# Patient Record
Sex: FEMALE | Race: OTHER | NOT HISPANIC OR LATINO
[De-identification: names, ages, dates, MRNs, and addresses within clinical notes are randomized per-mention and may not be internally consistent; named-entity substitution may affect disease eponyms.]

---

## 2018-08-23 ENCOUNTER — TRANSCRIPTION ENCOUNTER (OUTPATIENT)
Age: 68
End: 2018-08-23

## 2018-08-24 ENCOUNTER — EMERGENCY (EMERGENCY)
Facility: HOSPITAL | Age: 68
LOS: 1 days | Discharge: HOME | End: 2018-08-24
Attending: EMERGENCY MEDICINE
Payer: COMMERCIAL

## 2018-08-24 VITALS
RESPIRATION RATE: 16 BRPM | WEIGHT: 149.91 LBS | SYSTOLIC BLOOD PRESSURE: 142 MMHG | OXYGEN SATURATION: 98 % | HEART RATE: 72 BPM | TEMPERATURE: 98 F | DIASTOLIC BLOOD PRESSURE: 85 MMHG

## 2018-08-24 VITALS
OXYGEN SATURATION: 98 % | HEART RATE: 76 BPM | RESPIRATION RATE: 16 BRPM | DIASTOLIC BLOOD PRESSURE: 80 MMHG | SYSTOLIC BLOOD PRESSURE: 134 MMHG

## 2018-08-24 PROCEDURE — 99283 EMERGENCY DEPT VISIT LOW MDM: CPT

## 2018-08-24 PROCEDURE — 99285 EMERGENCY DEPT VISIT HI MDM: CPT | Mod: 25

## 2018-08-24 PROCEDURE — 13131 CMPLX RPR F/C/C/M/N/AX/G/H/F: CPT

## 2018-08-24 NOTE — ED ADULT NURSE NOTE - NSIMPLEMENTINTERV_GEN_ALL_ED
Implemented All Fall Risk Interventions:  [place] to call system. Call bell, personal items and telephone within reach. Instruct patient to call for assistance. Room bathroom lighting operational. Non-slip footwear when patient is off stretcher. Physically safe environment: no spills, clutter or unnecessary equipment. Stretcher in lowest position, wheels locked, appropriate side rails in place. Provide visual cue, wrist band, yellow gown, etc. Monitor gait and stability. Monitor for mental status changes and reorient to person, place, and time. Review medications for side effects contributing to fall risk. Reinforce activity limits and safety measures with patient and family.

## 2018-08-24 NOTE — ED PROVIDER NOTE - OBJECTIVE STATEMENT
67 y F mechanical trip and fall onto b/l knees, struck face on ground, pain and lac at nasal bridge.  No AMS, LOC, n/v, bleeding from nose.  Does not take medications.  No HA.  No neck pain.  No sensory deficits.  No motor weakness.  Unknown last tdap; declines update at this time after r/b/a discussion.  Family called plastic surgery for laceration prior to arrival.  No other complaints.

## 2018-08-24 NOTE — ED PROVIDER NOTE - MEDICAL DECISION MAKING DETAILS
Otherwise healthy 67 y F facial laceration after mechanical fall.  Plastics to close.  B/L knee abrasions with some TTP at tib plateau.  XRs, CTs; declines tdap and pain Rx.  Likely d/c.  --BMM

## 2018-08-24 NOTE — ED ADULT NURSE NOTE - OBJECTIVE STATEMENT
66 y/o female seen in [facility] s/p tripped and fell onto bilateral knees, pt struck face on the ground, sustaining laceration and pain to nasal bridge.  No LOC, no bleeding from nose, no n/v/d, no headache, no neck pain, no chest pain.  No acute respiratory distress noted.  1cm laceration to bridge of nose, mild swelling at bridge of nose.  Bilateral knee abrasion.

## 2018-08-24 NOTE — ED PROVIDER NOTE - CARE PLAN
Principal Discharge DX:	Nasal laceration, initial encounter  Goal:	b/l  Secondary Diagnosis:	Knee abrasion, unspecified laterality, initial encounter

## 2018-08-24 NOTE — ED PROVIDER NOTE - PHYSICAL EXAMINATION
GENERAL: AAOx4, GCS 15, NAD, WDWN; HEENT: MMM, no jugular venous distension, supple neck, PERRLA, EOMI, nonicteric sclera, mild TTP and swelling at bridge of nose, no septal hematoma; PULM: CTA B, no crackles/rubs/rales; CV: RRR, S1S2, no MRG; ABD: Flat abdomen, NTND, no R/G/R, no CVAT.  MSK: B/L abrasions to knees without laceration, no significant TTP or stepoff.  Extensor mechanism intact bilaterally.  Normal  gait.  BROWN, +2 pulses x4;  NEURO: No obvious focal deficits; PSYCH: AAOx3, clear thought and normal sensorium. SKIN -- 1cm lac to bridge of nose without evidence of FB.

## 2021-04-19 ENCOUNTER — RESULT REVIEW (OUTPATIENT)
Age: 71
End: 2021-04-19

## 2021-08-30 ENCOUNTER — RESULT REVIEW (OUTPATIENT)
Age: 71
End: 2021-08-30

## 2022-06-01 NOTE — ED PROVIDER NOTE - PROGRESS NOTE DETAILS
Plastics (Dr. Kim) to see patient.  --[facility] Plastics (Dr. Kim) to see patient.  Patient declines all imaging.  R/B/A of missed fx, hemorrhage, etc discussed.  --[facility]M Lac repaired by plastics.  --[facility] detailed exam

## 2022-10-26 ENCOUNTER — APPOINTMENT (OUTPATIENT)
Dept: GYNECOLOGIC ONCOLOGY | Facility: CLINIC | Age: 72
End: 2022-10-26

## 2022-10-26 VITALS
HEIGHT: 59.06 IN | SYSTOLIC BLOOD PRESSURE: 105 MMHG | WEIGHT: 150 LBS | HEART RATE: 108 BPM | DIASTOLIC BLOOD PRESSURE: 70 MMHG | BODY MASS INDEX: 30.24 KG/M2

## 2022-10-26 DIAGNOSIS — Z80.3 FAMILY HISTORY OF MALIGNANT NEOPLASM OF BREAST: ICD-10-CM

## 2022-10-26 DIAGNOSIS — C50.919 MALIGNANT NEOPLASM OF UNSPECIFIED SITE OF UNSPECIFIED FEMALE BREAST: ICD-10-CM

## 2022-10-26 DIAGNOSIS — Z80.0 FAMILY HISTORY OF MALIGNANT NEOPLASM OF DIGESTIVE ORGANS: ICD-10-CM

## 2022-10-26 PROCEDURE — 99204 OFFICE O/P NEW MOD 45 MIN: CPT

## 2022-10-26 NOTE — PHYSICAL EXAM
[Chaperone Present] : A chaperone was present in the examining room during all aspects of the physical examination [Normal] : Anus and perineum: Normal sphincter tone, no masses, no prolapse.

## 2022-10-26 NOTE — DISCUSSION/SUMMARY
[FreeTextEntry1] : 73 yo with history of breast cancer, now with persistent thickened endometrium\par \par I discussed my recommendations with Ms. Barker in detail\par \par Recent endometrial pathology with benign findings.  Given this, options include continued surveillance with sonograms every 6 months to evaluate endometrial thickening as well as monitoring for symptoms such as abdominal/pelvic pain or vaginal bleeding.  Alternative option includes definitive hysterectomy.  Risks/benefits of each option discussed.  She would like to proceed with observation at this time.  Recommend follow up sonogram in 2/2023.  If persistent thickened endometrium would recommend repeat sampling.

## 2022-10-26 NOTE — HISTORY OF PRESENT ILLNESS
[FreeTextEntry1] : Referred by Dr. Gutierrez\par \par 71 yo with persistent thickened endometrium.  She was diagnosed with breast cancer last year and underwent lumpectomy. Her treating medical oncologist is Dr. Phillips.  She is currently on Raloxifene.  \par \par Recent MRI pelvis in 6/2022 revealed EMS of 12mm (increased from 8mm in 5/2021).  D&C was performed which revealed fragments of endometrial polyp.  Follow up pelvic sonogram in 8/2022 revealed EMS of 7.7mm.\par \par Denies abdominal/pelvic pain, dyspnea or chest pain, vaginal bleeding or discharge, nausea/vomiting, changes in bowel habits or urination, lower extremity edema or pain.\par \par \par \par

## 2022-10-26 NOTE — OB HISTORY
[Total Preg ___] : : [unfilled] [Abortions ___] : [unfilled] (abortions) [Living ___] : [unfilled] (living) [Vaginal ___] : [unfilled] vaginal delivery(s)

## 2023-04-28 ENCOUNTER — APPOINTMENT (OUTPATIENT)
Dept: GYNECOLOGIC ONCOLOGY | Facility: CLINIC | Age: 73
End: 2023-04-28
Payer: MEDICARE

## 2023-04-28 VITALS
SYSTOLIC BLOOD PRESSURE: 125 MMHG | WEIGHT: 140 LBS | DIASTOLIC BLOOD PRESSURE: 72 MMHG | HEIGHT: 59.06 IN | HEART RATE: 83 BPM | BODY MASS INDEX: 28.22 KG/M2

## 2023-04-28 PROCEDURE — 99213 OFFICE O/P EST LOW 20 MIN: CPT

## 2023-04-28 NOTE — LETTER BODY
[Dear  ___] : Dear  [unfilled], [I recently saw our patient [unfilled] for a follow-up visit.] : I recently saw our patient, [unfilled] for a follow-up visit. [Attached please find my note.] : Attached please find my note. 28-Oct-2021 06:20

## 2023-04-28 NOTE — HISTORY OF PRESENT ILLNESS
[FreeTextEntry1] : 73 yo initially seen in 10/2022 for persistent thickened endometrium.  MRI in 6/2022 revealed EMS of 12mm followed by D&C with benign findings.  Pelvic sonogram in 8/2022 revealed EMS of 7.7mm.\par \par History of breast cancer on Raloxifene\par \par F/u sonogram 3/2023 uterus 6 x 3.3 x 4.6cm, right ovary 1.7 x 0.9 x 1cm, left ovary 2 x 1.2 x 1.5cm, EMS 7.6mm\par Endometrial aspirate 3/2023 with very scant endocervical and squamous epithelium, essentially mucus material\par \par No complaints.  Denies abdominal/pelvic pain, dyspnea or chest pain, vaginal bleeding or discharge, nausea/vomiting, changes in bowel habits or urination, lower extremity edema or pain.\par

## 2023-04-28 NOTE — DISCUSSION/SUMMARY
[FreeTextEntry1] : 71 yo with persistently thickened endometrium\par Most recent sonogram stable from prior\par I discussed options including definitive hysterectomy vs continued observation\par RIsks/benefits of each option discussed at length\par She would like to proceed with observation\par Continue sonograms q 6 months\par If increase in EMS or symptoms arise would recommend definitive hysterectomy

## 2023-12-08 ENCOUNTER — APPOINTMENT (OUTPATIENT)
Dept: GYNECOLOGIC ONCOLOGY | Facility: CLINIC | Age: 73
End: 2023-12-08
Payer: MEDICARE

## 2023-12-08 VITALS
HEART RATE: 64 BPM | DIASTOLIC BLOOD PRESSURE: 78 MMHG | WEIGHT: 136 LBS | SYSTOLIC BLOOD PRESSURE: 120 MMHG | BODY MASS INDEX: 27.41 KG/M2

## 2023-12-08 DIAGNOSIS — R93.89 ABNORMAL FINDINGS ON DIAGNOSTIC IMAGING OF OTHER SPECIFIED BODY STRUCTURES: ICD-10-CM

## 2023-12-08 PROCEDURE — 99213 OFFICE O/P EST LOW 20 MIN: CPT

## 2023-12-20 ENCOUNTER — NON-APPOINTMENT (OUTPATIENT)
Age: 73
End: 2023-12-20

## 2024-08-22 ENCOUNTER — APPOINTMENT (OUTPATIENT)
Dept: ULTRASOUND IMAGING | Facility: CLINIC | Age: 74
End: 2024-08-22
Payer: MEDICARE

## 2024-08-22 PROCEDURE — 76856 US EXAM PELVIC COMPLETE: CPT

## 2024-08-22 PROCEDURE — 76830 TRANSVAGINAL US NON-OB: CPT

## 2024-09-13 ENCOUNTER — APPOINTMENT (OUTPATIENT)
Dept: MRI IMAGING | Facility: CLINIC | Age: 74
End: 2024-09-13

## 2024-09-13 PROCEDURE — 72197 MRI PELVIS W/O & W/DYE: CPT

## 2024-09-13 PROCEDURE — A9585: CPT | Mod: JW

## 2024-09-27 ENCOUNTER — APPOINTMENT (OUTPATIENT)
Dept: GYNECOLOGIC ONCOLOGY | Facility: CLINIC | Age: 74
End: 2024-09-27
Payer: MEDICARE

## 2024-09-27 VITALS
SYSTOLIC BLOOD PRESSURE: 123 MMHG | HEART RATE: 68 BPM | BODY MASS INDEX: 27.62 KG/M2 | WEIGHT: 137 LBS | DIASTOLIC BLOOD PRESSURE: 81 MMHG | HEIGHT: 59.06 IN

## 2024-09-27 DIAGNOSIS — R93.89 ABNORMAL FINDINGS ON DIAGNOSTIC IMAGING OF OTHER SPECIFIED BODY STRUCTURES: ICD-10-CM

## 2024-09-27 PROCEDURE — 99214 OFFICE O/P EST MOD 30 MIN: CPT

## 2024-09-27 NOTE — DISCUSSION/SUMMARY
[Pre Op] : The differential diagnosis was discussed in detail. The indications, risks, benefits and alternatives were discussed. [unfilled] expressed an understanding of the treatment rationale and her questions were answered to her apparent satisfaction. [FreeTextEntry1] : 74 yo with persistent thickened endometrium No recent diagnostic endometrial biopsy since  2022.  All recent endometrial biopsies nondiagnostic I discussed with Ms. Barker that further evaluation of endometrium is warranted to rule out endometrial malignancy or hyperplasia.  Options include definitive hysterectomy vs D&C.  Risks/benefits of each option discussed.  She is hesitant to proceed with hysterectomy.  I discussed plan for minimally invasive approach as well as its associated recovery and expectations.  She would like to proceed with D&C in 12/2024.

## 2024-09-27 NOTE — HISTORY OF PRESENT ILLNESS
[FreeTextEntry1] : 73 yo initially seen in 10/2022 for persistent thickened endometrium.  MRI in 6/2022 revealed EMS of 12mm followed by D&C with benign findings.    8/2022 Pelvic sonogram - EMS of 7.7mm.   3/2023 Pelvic sonogram - uterus 6 x 3.3 x 4.6cm, right ovary 1.7 x 0.9 x 1cm, left ovary 2 x 1.2 x 1.5cm, EMS 7.6mm 3/2023 Endometrial aspirate with very scant endocervical and squamous epithelium, essentially mucus material 6/2023 Pelvic sonogram - uterus 5.5 x 4.1 x 3.9cm, left ovary 2.1 x 0.9 x 2.1cm, EMS 7.9mm, right ovary not visualized 6/2023 Pap negative hPV neg 9/2023 Pelvic sonogram - EMS 2mm unremarkable, ovaries not visualized 9/2023  6, postmenopausal JERMAINE 1.79 (normal), HE4 102 (mildly elevated) 3/2024 EMA nondiagnostic 8/2024 ECC benign 8/2024 TVUS - Uterus: 5.5 cm x 3.2 cm x 4.3 cm.  EMS Heterogeneous, thickened endometrium to 9 mm with extensive cystic change.  Right ovary: 2.1 cm x 1.4 cm x 1.5 cm. Within normal limits. Left ovary: 2.5 cm x 1.0 cm x 2.1 cm. 1.1 cm paraovarian cyst. 9/2024 MRI pelvis - Mildly thickened 7 mm postmenopausal heterogeneous endometrium without underlying myometrial abnormality previous MRI measured this at 11 mm 9/2024 ECC benign   No complaints.  Denies abdominal/pelvic pain, dyspnea or chest pain, vaginal bleeding or discharge, nausea/vomiting, changes in bowel habits or urination, lower extremity edema or pain.

## 2024-10-07 ENCOUNTER — NON-APPOINTMENT (OUTPATIENT)
Age: 74
End: 2024-10-07

## 2024-10-29 ENCOUNTER — APPOINTMENT (OUTPATIENT)
Dept: GYNECOLOGIC ONCOLOGY | Facility: HOSPITAL | Age: 74
End: 2024-10-29

## 2024-10-31 ENCOUNTER — OUTPATIENT (OUTPATIENT)
Dept: OUTPATIENT SERVICES | Facility: HOSPITAL | Age: 74
LOS: 1 days | End: 2024-10-31

## 2024-10-31 VITALS
TEMPERATURE: 98 F | HEART RATE: 82 BPM | OXYGEN SATURATION: 98 % | HEIGHT: 59 IN | DIASTOLIC BLOOD PRESSURE: 68 MMHG | RESPIRATION RATE: 15 BRPM | WEIGHT: 139.99 LBS | SYSTOLIC BLOOD PRESSURE: 115 MMHG

## 2024-10-31 DIAGNOSIS — R93.89 ABNORMAL FINDINGS ON DIAGNOSTIC IMAGING OF OTHER SPECIFIED BODY STRUCTURES: ICD-10-CM

## 2024-10-31 DIAGNOSIS — R94.31 ABNORMAL ELECTROCARDIOGRAM [ECG] [EKG]: ICD-10-CM

## 2024-10-31 DIAGNOSIS — Z98.890 OTHER SPECIFIED POSTPROCEDURAL STATES: Chronic | ICD-10-CM

## 2024-10-31 DIAGNOSIS — C50.919 MALIGNANT NEOPLASM OF UNSPECIFIED SITE OF UNSPECIFIED FEMALE BREAST: ICD-10-CM

## 2024-10-31 DIAGNOSIS — Z98.51 TUBAL LIGATION STATUS: Chronic | ICD-10-CM

## 2024-10-31 NOTE — H&P PST ADULT - MUSCULOSKELETAL COMMENTS
right foot with boots on- recent 4th toe fracture pt had recent right 4 th toe fracture 3 weeks ago, now boot on, s/p injury

## 2024-10-31 NOTE — H&P PST ADULT - HISTORY OF PRESENT ILLNESS
74 year old female, presents to [facility], with preop diagnosis of thickened endometrium, for pre op evaluation prior to scheduled surgery- dilation, curettage, hysteroscopy with Dr Mcnair. Patient reports  persistent thickened endometrium. s/p 9/2024 MRI pelvis - Mildly thickened 7 mm postmenopausal heterogeneous endometrium without underlying myometrium.Plan for hysteroscopy.  ?  74 year old female, presents to [facility], with preop diagnosis of thickened endometrium, for pre op evaluation prior to scheduled surgery- dilation, curettage, hysteroscopy with Dr Mcnair. Patient has  persistent thickened endometrium since 2022.. s/p  MRI pelvis -09/2024- showed  Mildly thickened 7 mm postmenopausal heterogeneous endometrium without underlying myometrium. Plan for hysteroscopy.  ?

## 2024-10-31 NOTE — H&P PST ADULT - GENITOURINARY COMMENTS
pre op diagnosis - thickened endometrium patient  had history of thickened endometrium since 2022, GYN  recommended for endometrial biopsy- plan for hysteroscopy, patient  is asymptomatic

## 2024-10-31 NOTE — H&P PST ADULT - NSANTHOSAYNRD_GEN_A_CORE
No. NELSY screening performed.  STOP BANG Legend: 0-2 = LOW Risk; 3-4 = INTERMEDIATE Risk; 5-8 = HIGH Risk

## 2024-10-31 NOTE — H&P PST ADULT - PROBLEM SELECTOR PLAN 2
Patient reports she was referred to cardiologist last friday due to abnormal EKG -   Patient had already seen cardiologist- 10/28/24, EKG  and ECHO completed - WN as per patient.  Patient denies any history of cardiac disease, and she is asymptomatic.  Copy of cardiology consultation including EKG and ECHO results requested Patient reports she was referred to cardiologist last friday due to abnormal EKG -   Patient had already seen cardiologist- 10/28/24, EKG  and ECHO completed - WN as per patient.    Patient denies any history of cardiac disease, and she is asymptomatic.  Copy of cardiology consultation including EKG and ECHO results requested Patient reports she was referred to cardiologist last friday due to abnormal EKG -   Patient had already seen cardiologist- 10/28/24, EKG  and ECHO completed - WN as per patient.    Patient denies any history of cardiac disease, and she is asymptomatic.  Copy of  EKG and ECHO results requested.

## 2024-10-31 NOTE — H&P PST ADULT - SKIN/BREAST COMMENTS
patient was diagnosed with breast cancer 2 years ago- s/p lumpectomy on raloxefin patient was diagnosed with breast cancer 2 years ago- s/p lumpectomy on raloxifene

## 2024-10-31 NOTE — H&P PST ADULT - PROBLEM SELECTOR PLAN 1
Patient is tentatively scheduled for dilation and curettage, hysteroscopy with Dr Mcnair- 11/12/24.    Pre-op instructions provided. Pt given verbal and written instructions with teach back on pepcid. Pt verbalized understanding with return demonstration.    Patient had CBC, BMP with PCP on 10/24/24- copy requested. Patient is tentatively scheduled for dilation and curettage, hysteroscopy with Dr Mcnair- 11/12/24.    Pre-op instructions provided. Pt given verbal and written instructions with teach back on pepcid. Pt verbalized understanding with return demonstration.    Patient had CBC  with PCP on 10/24/24- copy requested.

## 2024-11-12 ENCOUNTER — OUTPATIENT (OUTPATIENT)
Dept: INPATIENT UNIT | Facility: HOSPITAL | Age: 74
LOS: 1 days | Discharge: HOME | End: 2024-11-12
Payer: MEDICARE

## 2024-11-12 ENCOUNTER — TRANSCRIPTION ENCOUNTER (OUTPATIENT)
Age: 74
End: 2024-11-12

## 2024-11-12 ENCOUNTER — RESULT REVIEW (OUTPATIENT)
Age: 74
End: 2024-11-12

## 2024-11-12 ENCOUNTER — APPOINTMENT (OUTPATIENT)
Dept: GYNECOLOGIC ONCOLOGY | Facility: HOSPITAL | Age: 74
End: 2024-11-12

## 2024-11-12 VITALS
DIASTOLIC BLOOD PRESSURE: 62 MMHG | RESPIRATION RATE: 16 BRPM | TEMPERATURE: 98 F | SYSTOLIC BLOOD PRESSURE: 117 MMHG | HEIGHT: 59 IN | WEIGHT: 139.99 LBS | OXYGEN SATURATION: 98 % | HEART RATE: 76 BPM

## 2024-11-12 VITALS
RESPIRATION RATE: 12 BRPM | SYSTOLIC BLOOD PRESSURE: 102 MMHG | HEART RATE: 61 BPM | OXYGEN SATURATION: 98 % | DIASTOLIC BLOOD PRESSURE: 59 MMHG

## 2024-11-12 DIAGNOSIS — Z98.890 OTHER SPECIFIED POSTPROCEDURAL STATES: Chronic | ICD-10-CM

## 2024-11-12 DIAGNOSIS — R93.89 ABNORMAL FINDINGS ON DIAGNOSTIC IMAGING OF OTHER SPECIFIED BODY STRUCTURES: ICD-10-CM

## 2024-11-12 DIAGNOSIS — Z98.51 TUBAL LIGATION STATUS: Chronic | ICD-10-CM

## 2024-11-12 PROCEDURE — 58558 HYSTEROSCOPY BIOPSY: CPT

## 2024-11-12 PROCEDURE — 88305 TISSUE EXAM BY PATHOLOGIST: CPT | Mod: 26

## 2024-11-12 RX ORDER — OXYCODONE HYDROCHLORIDE 30 MG/1
5 TABLET ORAL ONCE
Refills: 0 | Status: DISCONTINUED | OUTPATIENT
Start: 2024-11-12 | End: 2024-11-12

## 2024-11-12 RX ORDER — FENTANYL CITRAT/DEXTROSE 5%/PF 1250MCG/50
50 PATIENT CONTROLLED ANALGESIA SYRINGE INTRAVENOUS
Refills: 0 | Status: DISCONTINUED | OUTPATIENT
Start: 2024-11-12 | End: 2024-11-12

## 2024-11-12 RX ORDER — ONDANSETRON HYDROCHLORIDE 2 MG/ML
4 INJECTION, SOLUTION INTRAMUSCULAR; INTRAVENOUS ONCE
Refills: 0 | Status: DISCONTINUED | OUTPATIENT
Start: 2024-11-12 | End: 2024-11-26

## 2024-11-12 RX ORDER — RALOXIFENE HYDROCHLORIDE 60 MG/1
1 TABLET, FILM COATED ORAL
Refills: 0 | DISCHARGE

## 2024-11-12 RX ADMIN — OXYCODONE HYDROCHLORIDE 5 MILLIGRAM(S): 30 TABLET ORAL at 10:15

## 2024-11-12 RX ADMIN — OXYCODONE HYDROCHLORIDE 5 MILLIGRAM(S): 30 TABLET ORAL at 09:46

## 2024-11-12 NOTE — ASU DISCHARGE PLAN (ADULT/PEDIATRIC) - CARE PROVIDER_API CALL
Kaylyn Mcnair  Gynecologic Oncology  [address] [place] [id number]  Phone: [phone]  Fax: [phone]  Follow Up Time: 2 weeks

## 2024-11-12 NOTE — ASU DISCHARGE PLAN (ADULT/PEDIATRIC) - NURSING INSTRUCTIONS
You received IV Tylenol for pain management at 8:45AM. Please DO NOT take any Tylenol (Acetaminophen) containing products, such as Vicodin, Percocet, Excedrin, and cold medications for the next 6 hours (until 2:45PM). DO NOT TAKE MORE THAN 4000 MG OF TYLENOL in a 24 hour period.

## 2024-11-12 NOTE — BRIEF OPERATIVE NOTE - OPERATION/FINDINGS
EUA revealed 6 wk size uterus. No adnexal fullness. Diagnostic hysteroscopy revealed atrophic-appearing endometrium. Bilateral ostia visualized and wnl. Excellent hemostasis at close of case.

## 2024-11-12 NOTE — ASU DISCHARGE PLAN (ADULT/PEDIATRIC) - NS MD DC FALL RISK RISK
For information on Fall & Injury Prevention, visit: [email or web address] OR  [email or web address] OR  https://www.cdc.gov/steadi/patient.html

## 2024-11-12 NOTE — ASU PREOP CHECKLIST - 4.
Right foot 3rd toe "broken, I bumped into something " Toes taped to 4th toe Right foot 3rd toe "broken, I bumped into something " Toe is taped to 4th toe

## 2024-11-12 NOTE — ASU DISCHARGE PLAN (ADULT/PEDIATRIC) - ASU DC SPECIAL INSTRUCTIONSFT
Return to your regular way of eating. Resume normal activity as tolerated, but no heavy lifting or strenuous activity for 2 weeks. Complete vaginal rest, no tampons, no douching, no tub bathing, no sexual activities for 2 weeks unless otherwise instructed by your doctor. Expect to have vaginal bleeding/spotting that will get lighter over time. For bleeding soaking through more than two pads per hour for two consecutive hours, or for passing clots greater than the size of your fist, call your doctor or come to the emergency department. Call your doctor with any signs or symptoms of infection such as fever, chills, nausea or vomiting. Call your doctor if you're unable to tolerate food or have difficulty urinating. Call your doctor if you have pain that is not relieved by medications.  Notify your doctor with any other concerns. Follow up with Dr. Mcnair in 2 weeks. Call the office to schedule an appointment if you do not already have one.      You can take up to 600 mg Ibuprofen every 6 hours and/or 975 mg Tylenol every 6 hours for pain. Alternate these medications so that you are taking one or the other every 3 hours.

## 2024-11-12 NOTE — ASU DISCHARGE PLAN (ADULT/PEDIATRIC) - FOLLOW UP APPOINTMENTS
[facility] may also call Recovery Room (PACU) 24/7 @ [phone]

## 2024-11-12 NOTE — ASU DISCHARGE PLAN (ADULT/PEDIATRIC) - FINANCIAL ASSISTANCE
[facility] provides services at a reduced cost to those who are determined to be eligible through [facility]’s financial assistance program. Information regarding [facility]’s financial assistance program can be found by going to [email or web address] or by calling [phone].

## 2024-11-13 ENCOUNTER — NON-APPOINTMENT (OUTPATIENT)
Age: 74
End: 2024-11-13

## 2024-11-14 LAB — SURGICAL PATHOLOGY STUDY: SIGNIFICANT CHANGE UP

## 2024-12-13 ENCOUNTER — APPOINTMENT (OUTPATIENT)
Dept: GYNECOLOGIC ONCOLOGY | Facility: CLINIC | Age: 74
End: 2024-12-13
Payer: MEDICARE

## 2024-12-13 ENCOUNTER — NON-APPOINTMENT (OUTPATIENT)
Age: 74
End: 2024-12-13

## 2024-12-13 VITALS
HEART RATE: 67 BPM | BODY MASS INDEX: 27.62 KG/M2 | DIASTOLIC BLOOD PRESSURE: 72 MMHG | WEIGHT: 137 LBS | SYSTOLIC BLOOD PRESSURE: 128 MMHG | RESPIRATION RATE: 16 BRPM | HEIGHT: 59.06 IN

## 2024-12-13 DIAGNOSIS — R93.89 ABNORMAL FINDINGS ON DIAGNOSTIC IMAGING OF OTHER SPECIFIED BODY STRUCTURES: ICD-10-CM

## 2024-12-13 PROCEDURE — 99213 OFFICE O/P EST LOW 20 MIN: CPT

## 2024-12-15 NOTE — ASU PATIENT PROFILE, ADULT - PRO INTERPRETER NEED 2
"Lab Results   Component Value Date    HGBA1C 5.9 (H) 04/18/2024       No results for input(s): \"POCGLU\" in the last 72 hours.    Blood Sugar Average: Last 72 hrs:  -Medical management    " English

## 2025-07-08 ENCOUNTER — NON-APPOINTMENT (OUTPATIENT)
Age: 75
End: 2025-07-08

## (undated) DEVICE — POSITIONER STRAP ARMBOARD VELCRO TS-30

## (undated) DEVICE — DRAPE LIGHT HANDLE COVER (GREEN)

## (undated) DEVICE — BASIN SET DOUBLE

## (undated) DEVICE — SOL IRR POUR NS 0.9% 1000ML

## (undated) DEVICE — GOWN XL

## (undated) DEVICE — PRESSURE INFUSOR BAG 1000ML

## (undated) DEVICE — DRAPE GYN IRRIGATION POUCH 19 X 23"

## (undated) DEVICE — LABELS BLANK W PEN

## (undated) DEVICE — SOL IRR POUR H2O 500ML

## (undated) DEVICE — ELCTR BOVIE PENCIL SMOKE EVACUATION

## (undated) DEVICE — PREP BETADINE KIT

## (undated) DEVICE — DRSG TELFA 3 X 8

## (undated) DEVICE — TUBING IRR SET FOR CYSTOSCOPY 77"

## (undated) DEVICE — PACK D&C

## (undated) DEVICE — DRAPE SOL WARMING 44X44IN

## (undated) DEVICE — TUBING SUCTION NONCONDUCTIVE 6MM X 12FT

## (undated) DEVICE — VENODYNE/SCD SLEEVE CALF MEDIUM

## (undated) DEVICE — SOL IRR POUR NS 0.9% 500ML

## (undated) DEVICE — PACK PROCEDURAL TRAY D & C

## (undated) DEVICE — GLV 6 PROTEXIS (WHITE)

## (undated) DEVICE — VISITEC 4X4

## (undated) DEVICE — GOWN LG